# Patient Record
Sex: MALE | Race: WHITE | NOT HISPANIC OR LATINO
[De-identification: names, ages, dates, MRNs, and addresses within clinical notes are randomized per-mention and may not be internally consistent; named-entity substitution may affect disease eponyms.]

---

## 2024-03-25 ENCOUNTER — NON-APPOINTMENT (OUTPATIENT)
Age: 89
End: 2024-03-25

## 2024-03-25 ENCOUNTER — APPOINTMENT (OUTPATIENT)
Dept: INTERNAL MEDICINE | Facility: CLINIC | Age: 89
End: 2024-03-25
Payer: MEDICARE

## 2024-03-25 VITALS — DIASTOLIC BLOOD PRESSURE: 60 MMHG | SYSTOLIC BLOOD PRESSURE: 118 MMHG

## 2024-03-25 VITALS
RESPIRATION RATE: 14 BRPM | HEART RATE: 71 BPM | OXYGEN SATURATION: 97 % | WEIGHT: 217 LBS | SYSTOLIC BLOOD PRESSURE: 138 MMHG | DIASTOLIC BLOOD PRESSURE: 62 MMHG | TEMPERATURE: 97.4 F | HEIGHT: 70.5 IN | BODY MASS INDEX: 30.72 KG/M2

## 2024-03-25 DIAGNOSIS — Z80.0 FAMILY HISTORY OF MALIGNANT NEOPLASM OF DIGESTIVE ORGANS: ICD-10-CM

## 2024-03-25 DIAGNOSIS — H91.90 UNSPECIFIED HEARING LOSS, UNSPECIFIED EAR: ICD-10-CM

## 2024-03-25 DIAGNOSIS — D69.6 THROMBOCYTOPENIA, UNSPECIFIED: ICD-10-CM

## 2024-03-25 DIAGNOSIS — D64.9 ANEMIA, UNSPECIFIED: ICD-10-CM

## 2024-03-25 DIAGNOSIS — Z00.00 ENCOUNTER FOR GENERAL ADULT MEDICAL EXAMINATION W/OUT ABNORMAL FINDINGS: ICD-10-CM

## 2024-03-25 DIAGNOSIS — R73.03 PREDIABETES.: ICD-10-CM

## 2024-03-25 DIAGNOSIS — Z87.891 PERSONAL HISTORY OF NICOTINE DEPENDENCE: ICD-10-CM

## 2024-03-25 DIAGNOSIS — Z63.4 DISAPPEARANCE AND DEATH OF FAMILY MEMBER: ICD-10-CM

## 2024-03-25 DIAGNOSIS — E66.9 OBESITY, UNSPECIFIED: ICD-10-CM

## 2024-03-25 DIAGNOSIS — N40.1 BENIGN PROSTATIC HYPERPLASIA WITH LOWER URINARY TRACT SYMPMS: ICD-10-CM

## 2024-03-25 DIAGNOSIS — Z82.49 FAMILY HISTORY OF ISCHEMIC HEART DISEASE AND OTHER DISEASES OF THE CIRCULATORY SYSTEM: ICD-10-CM

## 2024-03-25 DIAGNOSIS — K21.9 GASTRO-ESOPHAGEAL REFLUX DISEASE W/OUT ESOPHAGITIS: ICD-10-CM

## 2024-03-25 DIAGNOSIS — N13.8 BENIGN PROSTATIC HYPERPLASIA WITH LOWER URINARY TRACT SYMPMS: ICD-10-CM

## 2024-03-25 PROCEDURE — 93000 ELECTROCARDIOGRAM COMPLETE: CPT

## 2024-03-25 PROCEDURE — G0439: CPT

## 2024-03-25 PROCEDURE — 36415 COLL VENOUS BLD VENIPUNCTURE: CPT

## 2024-03-25 RX ORDER — OMEPRAZOLE 40 MG/1
40 CAPSULE, DELAYED RELEASE ORAL
Refills: 0 | Status: ACTIVE | COMMUNITY

## 2024-03-25 RX ORDER — FINASTERIDE 5 MG/1
5 TABLET, FILM COATED ORAL
Qty: 90 | Refills: 3 | Status: ACTIVE | COMMUNITY

## 2024-03-25 RX ORDER — TERAZOSIN HCL 2 MG
TABLET ORAL
Refills: 0 | Status: ACTIVE | COMMUNITY

## 2024-03-25 SDOH — SOCIAL STABILITY - SOCIAL INSECURITY: DISSAPEARANCE AND DEATH OF FAMILY MEMBER: Z63.4

## 2024-03-25 NOTE — HISTORY OF PRESENT ILLNESS
[FreeTextEntry1] : New pt [de-identified] : 89 year old M comes to establish medical care and for an annual exam. Previously followed by Dr. Peterson. Last physical in 3/23.  He has hx of BPH, GERD, and obesity. He is on Finasteride, Terazosin, and Omeprazole.  [facility] [place]. He wears hearing aids. Had full blood work checked 2 weeks ago.  He had b/l knee replacement > 10 yr ago. He reports his knees have been bothering him lately.

## 2024-03-25 NOTE — REVIEW OF SYSTEMS
[Negative] : Heme/Lymph [Joint Pain] : joint pain [Hearing Loss] : hearing loss [FreeTextEntry9] : b/l knee pain

## 2024-03-25 NOTE — HEALTH RISK ASSESSMENT
[Excellent] : ~his/her~  mood as  excellent [No] : In the past 12 months have you used drugs other than those required for medical reasons? No [No falls in past year] : Patient reported no falls in the past year [0] : 2) Feeling down, depressed, or hopeless: Not at all (0) [PHQ-2 Negative - No further assessment needed] : PHQ-2 Negative - No further assessment needed [Fully functional (bathing, dressing, toileting, transferring, walking, feeding)] : Fully functional (bathing, dressing, toileting, transferring, walking, feeding) [Fully functional (using the telephone, shopping, preparing meals, housekeeping, doing laundry, using] : Fully functional and needs no help or supervision to perform IADLs (using the telephone, shopping, preparing meals, housekeeping, doing laundry, using transportation, managing medications and managing finances) [Smoke Detector] : smoke detector [Carbon Monoxide Detector] : carbon monoxide detector [Sunscreen] : uses sunscreen [Seat Belt] :  uses seat belt [Former] : Former [> 15 Years] : > 15 Years [20 or more] : 20 or more [[id number]] : 0 [Reports changes in hearing] : Reports no changes in hearing [Reports changes in dental health] : Reports no changes in dental health [Reports changes in vision] : Reports no changes in vision

## 2024-03-25 NOTE — PLAN
[FreeTextEntry1] : [facility] from [facility] reviewed. Order BMP and PSA. Discussed diet, exercise, and weight maintenance. EKG NSR. Vaccines UTD. No need for further colonoscopy screening. BPH - on Finasteride and Terazosin. [facility]s renewed by [facility]. GERD - on PPI. Mild anemia / thrombocytopenia - chronic and stable.  See ortho for chronic knee pain, had previous b/l knee replacement.  RTO in 1 yr for annual exam or earlier PRN for acute care.

## 2024-03-25 NOTE — PHYSICAL EXAM
[Well Nourished] : well nourished [Well Developed] : well developed [No Acute Distress] : no acute distress [Normal Sclera/Conjunctiva] : normal sclera/conjunctiva [Well-Appearing] : well-appearing [PERRL] : pupils equal round and reactive to light [EOMI] : extraocular movements intact [Normal Outer Ear/Nose] : the outer ears and nose were normal in appearance [Normal Oropharynx] : the oropharynx was normal [Normal TMs] : both tympanic membranes were normal [No JVD] : no jugular venous distention [No Lymphadenopathy] : no lymphadenopathy [Supple] : supple [Thyroid Normal, No Nodules] : the thyroid was normal and there were no nodules present [No Respiratory Distress] : no respiratory distress  [Clear to Auscultation] : lungs were clear to auscultation bilaterally [Normal Rate] : normal rate  [Normal S1, S2] : normal S1 and S2 [Regular Rhythm] : with a regular rhythm [No Carotid Bruits] : no carotid bruits [No Murmur] : no murmur heard [No Varicosities] : no varicosities [Pedal Pulses Present] : the pedal pulses are present [No Edema] : there was no peripheral edema [Soft] : abdomen soft [Non Tender] : non-tender [No Masses] : no abdominal mass palpated [Non-distended] : non-distended [Normal Bowel Sounds] : normal bowel sounds [Normal Posterior Cervical Nodes] : no posterior cervical lymphadenopathy [Normal Anterior Cervical Nodes] : no anterior cervical lymphadenopathy [No Spinal Tenderness] : no spinal tenderness [No CVA Tenderness] : no CVA  tenderness [No Joint Swelling] : no joint swelling [Grossly Normal Strength/Tone] : grossly normal strength/tone [No Rash] : no rash [Coordination Grossly Intact] : coordination grossly intact [Normal Gait] : normal gait [No Focal Deficits] : no focal deficits [Normal Affect] : the affect was normal [Deep Tendon Reflexes (DTR)] : deep tendon reflexes were 2+ and symmetric [Normal Mood] : the mood was normal [Normal Insight/Judgement] : insight and judgment were intact [de-identified] : b/l knee surgical scars  [de-identified] : TM clear, right hearing aid [de-identified] : friendly male, looks excellent for stated age

## 2024-04-27 LAB
ANION GAP SERPL CALC-SCNC: 11 MMOL/L
BUN SERPL-MCNC: 23 MG/DL
CALCIUM SERPL-MCNC: 8.8 MG/DL
CHLORIDE SERPL-SCNC: 105 MMOL/L
CO2 SERPL-SCNC: 25 MMOL/L
CREAT SERPL-MCNC: 1.3 MG/DL
EGFR: 53 ML/MIN/1.73M2
ESTIMATED AVERAGE GLUCOSE: 123 MG/DL
GLUCOSE SERPL-MCNC: 126 MG/DL
HBA1C MFR BLD HPLC: 5.9 %
POTASSIUM SERPL-SCNC: 4.2 MMOL/L
PSA SERPL-MCNC: 1.16 NG/ML
SODIUM SERPL-SCNC: 141 MMOL/L

## 2024-08-07 ENCOUNTER — RX ONLY (RX ONLY)
Age: 89
End: 2024-08-07

## 2024-08-07 ENCOUNTER — OFFICE (OUTPATIENT)
Dept: URBAN - METROPOLITAN AREA CLINIC 109 | Facility: CLINIC | Age: 89
Setting detail: OPHTHALMOLOGY
End: 2024-08-07
Payer: COMMERCIAL

## 2024-08-07 DIAGNOSIS — H02.89: ICD-10-CM

## 2024-08-07 DIAGNOSIS — H40.033: ICD-10-CM

## 2024-08-07 DIAGNOSIS — H25.13: ICD-10-CM

## 2024-08-07 PROCEDURE — 76514 ECHO EXAM OF EYE THICKNESS: CPT | Performed by: OPHTHALMOLOGY

## 2024-08-07 PROCEDURE — 92133 CPTRZD OPH DX IMG PST SGM ON: CPT | Performed by: OPHTHALMOLOGY

## 2024-08-07 PROCEDURE — 92020 GONIOSCOPY: CPT | Performed by: OPHTHALMOLOGY

## 2024-08-07 PROCEDURE — 92004 COMPRE OPH EXAM NEW PT 1/>: CPT | Performed by: OPHTHALMOLOGY

## 2024-08-07 ASSESSMENT — CONFRONTATIONAL VISUAL FIELD TEST (CVF)
OS_FINDINGS: FULL
OD_FINDINGS: FULL

## 2024-08-07 ASSESSMENT — LID EXAM ASSESSMENTS
OS_MEIBOMITIS: LUL 1+
OD_MEIBOMITIS: RUL 1+

## 2024-09-20 ENCOUNTER — OFFICE (OUTPATIENT)
Dept: URBAN - METROPOLITAN AREA CLINIC 109 | Facility: CLINIC | Age: 89
Setting detail: OPHTHALMOLOGY
End: 2024-09-20
Payer: COMMERCIAL

## 2024-09-20 DIAGNOSIS — H25.11: ICD-10-CM

## 2024-09-20 DIAGNOSIS — H52.223: ICD-10-CM

## 2024-09-20 DIAGNOSIS — H25.13: ICD-10-CM

## 2024-09-20 PROCEDURE — 92136 OPHTHALMIC BIOMETRY: CPT | Performed by: OPHTHALMOLOGY

## 2024-09-20 PROCEDURE — 99213 OFFICE O/P EST LOW 20 MIN: CPT | Performed by: OPHTHALMOLOGY

## 2024-09-20 PROCEDURE — 92025 CPTRIZED CORNEAL TOPOGRAPHY: CPT | Performed by: OPHTHALMOLOGY

## 2024-09-20 ASSESSMENT — CONFRONTATIONAL VISUAL FIELD TEST (CVF)
OS_FINDINGS: FULL
OD_FINDINGS: FULL

## 2024-09-20 ASSESSMENT — LID EXAM ASSESSMENTS
OS_MEIBOMITIS: LUL 1+
OD_MEIBOMITIS: RUL 1+

## 2024-09-25 PROBLEM — Z01.818 PREOPERATIVE EXAMINATION: Status: ACTIVE | Noted: 2024-09-25

## 2024-09-26 ENCOUNTER — APPOINTMENT (OUTPATIENT)
Dept: INTERNAL MEDICINE | Facility: CLINIC | Age: 89
End: 2024-09-26
Payer: MEDICARE

## 2024-09-26 VITALS
HEART RATE: 79 BPM | DIASTOLIC BLOOD PRESSURE: 66 MMHG | RESPIRATION RATE: 14 BRPM | SYSTOLIC BLOOD PRESSURE: 140 MMHG | HEIGHT: 70.5 IN | BODY MASS INDEX: 30.58 KG/M2 | WEIGHT: 216 LBS | OXYGEN SATURATION: 95 %

## 2024-09-26 DIAGNOSIS — Z01.818 ENCOUNTER FOR OTHER PREPROCEDURAL EXAMINATION: ICD-10-CM

## 2024-09-26 PROCEDURE — 99214 OFFICE O/P EST MOD 30 MIN: CPT

## 2024-09-26 NOTE — ASSESSMENT
[Patient Optimized for Surgery] : Patient optimized for surgery [No Further Testing Recommended] : no further testing recommended [FreeTextEntry4] : Patient is a low cardiac risk for a low risk surgical procedure. No medical contraindications for planned surgery.

## 2024-09-26 NOTE — PHYSICAL EXAM
[No Acute Distress] : no acute distress [Well Nourished] : well nourished [Well Developed] : well developed [Well-Appearing] : well-appearing [Normal Sclera/Conjunctiva] : normal sclera/conjunctiva [PERRL] : pupils equal round and reactive to light [EOMI] : extraocular movements intact [Normal Outer Ear/Nose] : the outer ears and nose were normal in appearance [Normal Oropharynx] : the oropharynx was normal [Normal TMs] : both tympanic membranes were normal [No Lymphadenopathy] : no lymphadenopathy [Supple] : supple [Thyroid Normal, No Nodules] : the thyroid was normal and there were no nodules present [No Respiratory Distress] : no respiratory distress  [Clear to Auscultation] : lungs were clear to auscultation bilaterally [Normal Rate] : normal rate  [Regular Rhythm] : with a regular rhythm [Normal S1, S2] : normal S1 and S2 [No Murmur] : no murmur heard [No Varicosities] : no varicosities [Pedal Pulses Present] : the pedal pulses are present [No Edema] : there was no peripheral edema [Soft] : abdomen soft [Non Tender] : non-tender [Non-distended] : non-distended [No Masses] : no abdominal mass palpated [Normal Bowel Sounds] : normal bowel sounds [Normal Posterior Cervical Nodes] : no posterior cervical lymphadenopathy [Normal Anterior Cervical Nodes] : no anterior cervical lymphadenopathy [No CVA Tenderness] : no CVA  tenderness [No Spinal Tenderness] : no spinal tenderness [No Joint Swelling] : no joint swelling [Grossly Normal Strength/Tone] : grossly normal strength/tone [No Rash] : no rash [Coordination Grossly Intact] : coordination grossly intact [No Focal Deficits] : no focal deficits [Normal Gait] : normal gait [Deep Tendon Reflexes (DTR)] : deep tendon reflexes were 2+ and symmetric [Normal Affect] : the affect was normal [Normal Mood] : the mood was normal [Normal Insight/Judgement] : insight and judgment were intact [de-identified] : friendly male, looks excellent for stated age  [de-identified] : b/l knee surgical scars  [de-identified] : right hearing aid

## 2024-09-26 NOTE — HISTORY OF PRESENT ILLNESS
[No Pertinent Cardiac History] : no history of aortic stenosis, atrial fibrillation, coronary artery disease, recent myocardial infarction, or implantable device/pacemaker [No Pertinent Pulmonary History] : no history of asthma, COPD, sleep apnea, or smoking [No Adverse Anesthesia Reaction] : no adverse anesthesia reaction in self or family member [(Patient denies any chest pain, claudication, dyspnea on exertion, orthopnea, palpitations or syncope)] : Patient denies any chest pain, claudication, dyspnea on exertion, orthopnea, palpitations or syncope [Excellent (>10 METs)] : Excellent (>10 METs) [Chronic Anticoagulation] : no chronic anticoagulation [Chronic Kidney Disease] : no chronic kidney disease [Diabetes] : no diabetes [FreeTextEntry1] : Cataract extraction [FreeTextEntry2] : 10/1/24 (right), 10/15/24 (left) [FreeTextEntry3] : Dr. Kong Barroso [FreeTextEntry7] : EKG 3/24 - normal

## 2024-09-26 NOTE — REVIEW OF SYSTEMS
[Hearing Loss] : hearing loss [Joint Pain] : joint pain [Negative] : Heme/Lymph [FreeTextEntry9] : b/l knee pain [Vision Problems] : vision problems

## 2024-10-01 ENCOUNTER — AMBULATORY SURGERY CENTER (OUTPATIENT)
Dept: URBAN - METROPOLITAN AREA SURGERY 19 | Facility: SURGERY | Age: 89
Setting detail: OPHTHALMOLOGY
End: 2024-10-01
Payer: COMMERCIAL

## 2024-10-01 DIAGNOSIS — H52.221: ICD-10-CM

## 2024-10-01 DIAGNOSIS — H25.11: ICD-10-CM

## 2024-10-01 PROCEDURE — V2787 ASTIGMATISM-CORRECT FUNCTION: HCPCS | Performed by: OPHTHALMOLOGY

## 2024-10-01 PROCEDURE — 66984 XCAPSL CTRC RMVL W/O ECP: CPT | Mod: RT | Performed by: OPHTHALMOLOGY

## 2024-10-02 ENCOUNTER — OFFICE (OUTPATIENT)
Dept: URBAN - METROPOLITAN AREA CLINIC 109 | Facility: CLINIC | Age: 89
Setting detail: OPHTHALMOLOGY
End: 2024-10-02
Payer: COMMERCIAL

## 2024-10-02 ENCOUNTER — RX ONLY (RX ONLY)
Age: 89
End: 2024-10-02

## 2024-10-02 DIAGNOSIS — Z96.1: ICD-10-CM

## 2024-10-02 PROBLEM — H25.12 CATARACT SENILE NUCLEAR SCLEROSIS; LEFT EYE: Status: ACTIVE | Noted: 2024-09-20

## 2024-10-02 PROBLEM — H52.223 ASTIGMATISM, REGULAR; BOTH EYES: Status: ACTIVE | Noted: 2024-09-20

## 2024-10-02 PROCEDURE — 99024 POSTOP FOLLOW-UP VISIT: CPT | Performed by: OPTOMETRIST

## 2024-10-02 ASSESSMENT — KERATOMETRY
OS_AXISANGLE_DEGREES: 080
OD_K1POWER_DIOPTERS: 42.75
OD_AXISANGLE_DEGREES: 014
OD_K2POWER_DIOPTERS: 44.75
OS_K2POWER_DIOPTERS: 44.75
OS_K1POWER_DIOPTERS: 44.00

## 2024-10-02 ASSESSMENT — REFRACTION_MANIFEST
OS_SPHERE: +2.50
OD_VA1: 20/NI
OS_VA1: 20/NI
OD_CYLINDER: -3.00
OD_AXIS: 097
OS_AXIS: 105
OS_CYLINDER: -1.25
OD_SPHERE: +1.50

## 2024-10-02 ASSESSMENT — REFRACTION_AUTOREFRACTION
OD_CYLINDER: -3.25
OS_CYLINDER: -1.25
OS_SPHERE: +2.75
OD_AXIS: 097
OS_AXIS: 105
OD_SPHERE: +1.50

## 2024-10-02 ASSESSMENT — CORNEAL EDEMA CLINICAL DESCRIPTION: OD_CORNEALEDEMA: T 1+

## 2024-10-02 ASSESSMENT — LID EXAM ASSESSMENTS
OD_MEIBOMITIS: RUL 1+
OS_MEIBOMITIS: LUL 1+

## 2024-10-02 ASSESSMENT — REFRACTION_CURRENTRX
OD_SPHERE: +3.75
OD_ADD: +3.00
OD_OVR_VA: 20/
OS_SPHERE: +4.50
OD_CYLINDER: -2.25
OS_CYLINDER: -1.75
OS_AXIS: 088
OS_OVR_VA: 20/
OD_AXIS: 094
OS_ADD: +3.00

## 2024-10-02 ASSESSMENT — CONFRONTATIONAL VISUAL FIELD TEST (CVF)
OS_FINDINGS: FULL
OD_FINDINGS: FULL

## 2024-10-02 ASSESSMENT — TONOMETRY
OD_IOP_MMHG: 16
OS_IOP_MMHG: 14

## 2024-10-02 ASSESSMENT — PACHYMETRY
OS_CT_CORRECTION: -2
OS_CT_UM: 571
OD_CT_CORRECTION: -2
OD_CT_UM: 577

## 2024-10-02 ASSESSMENT — VISUAL ACUITY
OD_BCVA: 20/25-3
OS_BCVA: 20/60-2

## 2024-10-09 ENCOUNTER — RX ONLY (RX ONLY)
Age: 89
End: 2024-10-09

## 2024-10-09 ENCOUNTER — OFFICE (OUTPATIENT)
Dept: URBAN - METROPOLITAN AREA CLINIC 109 | Facility: CLINIC | Age: 89
Setting detail: OPHTHALMOLOGY
End: 2024-10-09
Payer: COMMERCIAL

## 2024-10-09 DIAGNOSIS — H25.12: ICD-10-CM

## 2024-10-09 DIAGNOSIS — Z96.1: ICD-10-CM

## 2024-10-09 PROCEDURE — 92136 OPHTHALMIC BIOMETRY: CPT | Mod: 26,LT | Performed by: OPHTHALMOLOGY

## 2024-10-09 PROCEDURE — 99024 POSTOP FOLLOW-UP VISIT: CPT | Performed by: OPHTHALMOLOGY

## 2024-10-09 ASSESSMENT — LID EXAM ASSESSMENTS
OD_MEIBOMITIS: RUL 1+
OS_MEIBOMITIS: LUL 1+

## 2024-10-09 ASSESSMENT — REFRACTION_CURRENTRX
OS_AXIS: 088
OD_OVR_VA: 20/
OD_ADD: +3.00
OD_CYLINDER: -2.25
OS_OVR_VA: 20/
OD_AXIS: 094
OS_SPHERE: +4.50
OS_ADD: +3.00
OS_CYLINDER: -1.75
OD_SPHERE: +3.75

## 2024-10-09 ASSESSMENT — TONOMETRY
OS_IOP_MMHG: 11
OD_IOP_MMHG: 10

## 2024-10-09 ASSESSMENT — REFRACTION_MANIFEST
OS_SPHERE: +2.50
OS_AXIS: 105
OD_AXIS: 097
OS_VA1: 20/NI
OD_SPHERE: +1.50
OS_CYLINDER: -1.25
OD_VA1: 20/NI
OD_CYLINDER: -3.00

## 2024-10-09 ASSESSMENT — PACHYMETRY
OS_CT_UM: 571
OS_CT_CORRECTION: -2
OD_CT_UM: 577
OD_CT_CORRECTION: -2

## 2024-10-09 ASSESSMENT — CONFRONTATIONAL VISUAL FIELD TEST (CVF)
OD_FINDINGS: FULL
OS_FINDINGS: FULL

## 2024-10-09 ASSESSMENT — REFRACTION_AUTOREFRACTION
OD_AXIS: 111
OS_SPHERE: +3.50
OD_CYLINDER: -1.00
OS_CYLINDER: -2.00
OS_AXIS: 096
OD_SPHERE: +1.25

## 2024-10-09 ASSESSMENT — VISUAL ACUITY
OS_BCVA: 20/25-3
OD_BCVA: 20/25-3

## 2024-10-09 ASSESSMENT — KERATOMETRY
OS_K2POWER_DIOPTERS: 44.50
OS_K1POWER_DIOPTERS: 43.75
OD_K2POWER_DIOPTERS: 44.50
OD_AXISANGLE_DEGREES: 007
OS_AXISANGLE_DEGREES: 003
OD_K1POWER_DIOPTERS: 42.50

## 2024-10-15 ENCOUNTER — AMBULATORY SURGERY CENTER (OUTPATIENT)
Dept: URBAN - METROPOLITAN AREA SURGERY 19 | Facility: SURGERY | Age: 89
Setting detail: OPHTHALMOLOGY
End: 2024-10-15
Payer: COMMERCIAL

## 2024-10-15 DIAGNOSIS — H25.12: ICD-10-CM

## 2024-10-15 PROCEDURE — 66984 XCAPSL CTRC RMVL W/O ECP: CPT | Mod: 79,LT | Performed by: OPHTHALMOLOGY

## 2024-10-16 ENCOUNTER — RX ONLY (RX ONLY)
Age: 89
End: 2024-10-16

## 2024-10-16 ENCOUNTER — OFFICE (OUTPATIENT)
Dept: URBAN - METROPOLITAN AREA CLINIC 109 | Facility: CLINIC | Age: 89
Setting detail: OPHTHALMOLOGY
End: 2024-10-16
Payer: COMMERCIAL

## 2024-10-16 DIAGNOSIS — Z96.1: ICD-10-CM

## 2024-10-16 PROBLEM — H40.032 NARROW ANGLE GLAUCOMA SUSPECT; LEFT EYE: Status: ACTIVE | Noted: 2024-10-09

## 2024-10-16 PROCEDURE — 99024 POSTOP FOLLOW-UP VISIT: CPT | Performed by: OPTOMETRIST

## 2024-10-16 ASSESSMENT — REFRACTION_AUTOREFRACTION
OD_CYLINDER: -1.00
OS_CYLINDER: -2.00
OD_AXIS: 111
OS_SPHERE: +3.50
OS_AXIS: 096
OD_SPHERE: +1.25

## 2024-10-16 ASSESSMENT — REFRACTION_CURRENTRX
OS_CYLINDER: -1.75
OD_AXIS: 094
OS_ADD: +3.00
OD_CYLINDER: -2.25
OS_SPHERE: +4.50
OD_OVR_VA: 20/
OD_ADD: +3.00
OS_AXIS: 088
OD_SPHERE: +3.75
OS_OVR_VA: 20/

## 2024-10-16 ASSESSMENT — KERATOMETRY
OS_K1POWER_DIOPTERS: 43.75
OD_K2POWER_DIOPTERS: 44.50
OD_AXISANGLE_DEGREES: 007
OD_K1POWER_DIOPTERS: 42.50
OS_K2POWER_DIOPTERS: 44.50
OS_AXISANGLE_DEGREES: 003

## 2024-10-16 ASSESSMENT — REFRACTION_MANIFEST
OD_AXIS: 097
OD_CYLINDER: -3.00
OS_CYLINDER: -1.25
OS_AXIS: 105
OD_SPHERE: +1.50
OS_VA1: 20/NI
OS_SPHERE: +2.50
OD_VA1: 20/NI

## 2024-10-16 ASSESSMENT — VISUAL ACUITY
OD_BCVA: 20/25-1
OS_BCVA: 20/20-1

## 2024-11-12 ENCOUNTER — OFFICE (OUTPATIENT)
Dept: URBAN - METROPOLITAN AREA CLINIC 109 | Facility: CLINIC | Age: 89
Setting detail: OPHTHALMOLOGY
End: 2024-11-12
Payer: COMMERCIAL

## 2024-11-12 DIAGNOSIS — Z96.1: ICD-10-CM

## 2024-11-12 PROCEDURE — 99024 POSTOP FOLLOW-UP VISIT: CPT | Performed by: OPTOMETRIST

## 2024-11-12 ASSESSMENT — PACHYMETRY
OS_CT_CORRECTION: -2
OS_CT_UM: 571
OD_CT_UM: 577
OD_CT_CORRECTION: -2

## 2024-11-12 ASSESSMENT — REFRACTION_MANIFEST
OD_VA1: 20/NI
OS_VA1: 20/NI
OD_SPHERE: +1.50
OD_AXIS: 097
OD_CYLINDER: -3.00
OS_SPHERE: +2.50
OS_AXIS: 105
OS_CYLINDER: -1.25

## 2024-11-12 ASSESSMENT — REFRACTION_CURRENTRX
OS_OVR_VA: 20/
OD_OVR_VA: 20/
OS_ADD: +3.00
OD_SPHERE: +3.75
OD_CYLINDER: -2.25
OS_CYLINDER: -1.75
OS_SPHERE: +4.50
OD_ADD: +3.00
OD_AXIS: 094
OS_AXIS: 088

## 2024-11-12 ASSESSMENT — REFRACTION_AUTOREFRACTION
OS_CYLINDER: -1.75
OD_AXIS: 005
OS_AXIS: 028
OD_CYLINDER: -1.00
OS_SPHERE: -0.25
OD_SPHERE: PL

## 2024-11-12 ASSESSMENT — CONFRONTATIONAL VISUAL FIELD TEST (CVF)
OS_FINDINGS: FULL
OD_FINDINGS: FULL

## 2024-11-12 ASSESSMENT — LID EXAM ASSESSMENTS
OD_MEIBOMITIS: RUL 1+
OS_MEIBOMITIS: LUL 1+

## 2024-11-12 ASSESSMENT — KERATOMETRY
OS_K1POWER_DIOPTERS: 43.75
OD_K1POWER_DIOPTERS: 42.50
OS_AXISANGLE_DEGREES: 003
OS_K2POWER_DIOPTERS: 44.50
OD_K2POWER_DIOPTERS: 44.50
OD_AXISANGLE_DEGREES: 007

## 2024-11-12 ASSESSMENT — TONOMETRY
OD_IOP_MMHG: 18
OS_IOP_MMHG: 16

## 2024-11-12 ASSESSMENT — VISUAL ACUITY
OS_BCVA: 20/20-1
OD_BCVA: 20/25

## 2024-12-03 ENCOUNTER — APPOINTMENT (OUTPATIENT)
Dept: INTERNAL MEDICINE | Facility: CLINIC | Age: 88
End: 2024-12-03
Payer: MEDICARE

## 2024-12-03 VITALS
OXYGEN SATURATION: 96 % | HEART RATE: 67 BPM | DIASTOLIC BLOOD PRESSURE: 62 MMHG | HEIGHT: 70.5 IN | BODY MASS INDEX: 30.01 KG/M2 | RESPIRATION RATE: 14 BRPM | WEIGHT: 212 LBS | SYSTOLIC BLOOD PRESSURE: 120 MMHG | TEMPERATURE: 97.9 F

## 2024-12-03 DIAGNOSIS — R42 DIZZINESS AND GIDDINESS: ICD-10-CM

## 2024-12-03 PROCEDURE — 99213 OFFICE O/P EST LOW 20 MIN: CPT

## 2024-12-03 RX ORDER — MECLIZINE HYDROCHLORIDE 12.5 MG/1
12.5 TABLET ORAL 3 TIMES DAILY
Qty: 30 | Refills: 3 | Status: ACTIVE | COMMUNITY
Start: 2024-12-03 | End: 1900-01-01

## 2024-12-11 ENCOUNTER — OFFICE (OUTPATIENT)
Dept: URBAN - METROPOLITAN AREA CLINIC 109 | Facility: CLINIC | Age: 89
Setting detail: OPHTHALMOLOGY
End: 2024-12-11
Payer: COMMERCIAL

## 2024-12-11 DIAGNOSIS — H53.10: ICD-10-CM

## 2024-12-11 PROCEDURE — 99213 OFFICE O/P EST LOW 20 MIN: CPT | Mod: 24 | Performed by: OPHTHALMOLOGY

## 2024-12-11 ASSESSMENT — REFRACTION_MANIFEST
OD_AXIS: 120
OD_SPHERE: PLANO
OD_VA1: 20/25+
OD_CYLINDER: -0.50

## 2024-12-11 ASSESSMENT — REFRACTION_AUTOREFRACTION
OD_SPHERE: +0.50
OD_CYLINDER: -0.75
OS_CYLINDER: -1.00
OD_AXIS: 120
OS_AXIS: 107
OS_SPHERE: +0.50

## 2024-12-11 ASSESSMENT — REFRACTION_CURRENTRX
OD_SPHERE: +3.75
OS_CYLINDER: -1.75
OD_ADD: +3.00
OS_OVR_VA: 20/
OS_SPHERE: +4.50
OD_OVR_VA: 20/
OD_AXIS: 094
OS_ADD: +3.00
OD_CYLINDER: -2.25
OS_AXIS: 088

## 2024-12-11 ASSESSMENT — PACHYMETRY
OD_CT_UM: 577
OS_CT_UM: 571
OS_CT_CORRECTION: -2
OD_CT_CORRECTION: -2

## 2024-12-11 ASSESSMENT — CONFRONTATIONAL VISUAL FIELD TEST (CVF)
OD_FINDINGS: FULL
OS_FINDINGS: FULL

## 2024-12-11 ASSESSMENT — KERATOMETRY
OD_K2POWER_DIOPTERS: 44.50
OD_K1POWER_DIOPTERS: 42.50
OS_K1POWER_DIOPTERS: 43.75
OD_AXISANGLE_DEGREES: 007
OS_K2POWER_DIOPTERS: 44.50
OS_AXISANGLE_DEGREES: 003

## 2024-12-11 ASSESSMENT — TONOMETRY
OD_IOP_MMHG: 15
OS_IOP_MMHG: 16

## 2024-12-11 ASSESSMENT — VISUAL ACUITY
OS_BCVA: 20/40+2
OD_BCVA: 20/20

## 2024-12-11 ASSESSMENT — LID EXAM ASSESSMENTS
OD_MEIBOMITIS: RUL 1+
OS_MEIBOMITIS: LUL 1+

## 2025-03-11 ENCOUNTER — OFFICE (OUTPATIENT)
Dept: URBAN - METROPOLITAN AREA CLINIC 109 | Facility: CLINIC | Age: OVER 89
Setting detail: OPHTHALMOLOGY
End: 2025-03-11
Payer: COMMERCIAL

## 2025-03-11 DIAGNOSIS — H53.10: ICD-10-CM

## 2025-03-11 DIAGNOSIS — H02.89: ICD-10-CM

## 2025-03-11 PROCEDURE — 99213 OFFICE O/P EST LOW 20 MIN: CPT | Performed by: OPTOMETRIST

## 2025-03-11 ASSESSMENT — REFRACTION_MANIFEST
OS_ADD: +2.50
OS_AXIS: 090
OD_VA1: 20/20
OS_CYLINDER: -0.50
OU_VA: 20/20
OD_SPHERE: +0.25
OD_AXIS: 110
OS_VA1: 20/20
OD_ADD: +2.50
OS_SPHERE: +0.75
OD_CYLINDER: -0.25

## 2025-03-11 ASSESSMENT — REFRACTION_CURRENTRX
OS_CYLINDER: -1.75
OS_AXIS: 088
OD_SPHERE: +3.75
OS_SPHERE: +4.50
OS_OVR_VA: 20/
OD_CYLINDER: -2.25
OS_ADD: +3.00
OD_AXIS: 094
OD_OVR_VA: 20/
OD_ADD: +3.00

## 2025-03-11 ASSESSMENT — TONOMETRY
OS_IOP_MMHG: 13
OD_IOP_MMHG: 11

## 2025-03-11 ASSESSMENT — CONFRONTATIONAL VISUAL FIELD TEST (CVF)
OS_FINDINGS: FULL
OD_FINDINGS: FULL

## 2025-03-11 ASSESSMENT — REFRACTION_AUTOREFRACTION
OS_AXIS: 091
OD_SPHERE: +0.75
OS_CYLINDER: -1.50
OD_CYLINDER: -0.75
OD_AXIS: 108
OS_SPHERE: +1.25

## 2025-03-11 ASSESSMENT — KERATOMETRY
OD_K2POWER_DIOPTERS: 44.50
OS_K2POWER_DIOPTERS: 44.50
OD_AXISANGLE_DEGREES: 007
OD_K1POWER_DIOPTERS: 42.50
OS_K1POWER_DIOPTERS: 43.75
OS_AXISANGLE_DEGREES: 003

## 2025-03-11 ASSESSMENT — PACHYMETRY
OS_CT_UM: 571
OD_CT_UM: 577
OD_CT_CORRECTION: -2
OS_CT_CORRECTION: -2

## 2025-03-11 ASSESSMENT — LID EXAM ASSESSMENTS
OS_MEIBOMITIS: LUL 1+
OD_MEIBOMITIS: RUL 1+

## 2025-03-11 ASSESSMENT — VISUAL ACUITY
OD_BCVA: 20/20-1
OS_BCVA: 20/20-1

## 2025-03-31 ENCOUNTER — NON-APPOINTMENT (OUTPATIENT)
Age: 89
End: 2025-03-31

## 2025-03-31 ENCOUNTER — APPOINTMENT (OUTPATIENT)
Dept: INTERNAL MEDICINE | Facility: CLINIC | Age: 89
End: 2025-03-31
Payer: MEDICARE

## 2025-03-31 VITALS
SYSTOLIC BLOOD PRESSURE: 126 MMHG | HEART RATE: 74 BPM | WEIGHT: 210 LBS | TEMPERATURE: 98.1 F | DIASTOLIC BLOOD PRESSURE: 56 MMHG | RESPIRATION RATE: 14 BRPM | HEIGHT: 70.5 IN | BODY MASS INDEX: 29.73 KG/M2 | OXYGEN SATURATION: 95 %

## 2025-03-31 DIAGNOSIS — Z00.00 ENCOUNTER FOR GENERAL ADULT MEDICAL EXAMINATION W/OUT ABNORMAL FINDINGS: ICD-10-CM

## 2025-03-31 DIAGNOSIS — R73.03 PREDIABETES.: ICD-10-CM

## 2025-03-31 DIAGNOSIS — K21.9 GASTRO-ESOPHAGEAL REFLUX DISEASE W/OUT ESOPHAGITIS: ICD-10-CM

## 2025-03-31 DIAGNOSIS — N13.8 BENIGN PROSTATIC HYPERPLASIA WITH LOWER URINARY TRACT SYMPMS: ICD-10-CM

## 2025-03-31 DIAGNOSIS — D64.9 ANEMIA, UNSPECIFIED: ICD-10-CM

## 2025-03-31 DIAGNOSIS — N40.1 BENIGN PROSTATIC HYPERPLASIA WITH LOWER URINARY TRACT SYMPMS: ICD-10-CM

## 2025-03-31 DIAGNOSIS — R09.89 OTHER SPECIFIED SYMPTOMS AND SIGNS INVOLVING THE CIRCULATORY AND RESPIRATORY SYSTEMS: ICD-10-CM

## 2025-03-31 DIAGNOSIS — I49.8 OTHER SPECIFIED CARDIAC ARRHYTHMIAS: ICD-10-CM

## 2025-03-31 DIAGNOSIS — E66.811 OBESITY, CLASS 1: ICD-10-CM

## 2025-03-31 DIAGNOSIS — D69.6 THROMBOCYTOPENIA, UNSPECIFIED: ICD-10-CM

## 2025-03-31 LAB — HBA1C MFR BLD HPLC: 6

## 2025-03-31 PROCEDURE — G0439: CPT

## 2025-03-31 PROCEDURE — 93000 ELECTROCARDIOGRAM COMPLETE: CPT

## 2025-03-31 PROCEDURE — 99214 OFFICE O/P EST MOD 30 MIN: CPT | Mod: 25

## 2025-04-23 ENCOUNTER — APPOINTMENT (OUTPATIENT)
Dept: OTOLARYNGOLOGY | Facility: CLINIC | Age: 89
End: 2025-04-23
Payer: MEDICARE

## 2025-04-23 VITALS
WEIGHT: 220 LBS | HEIGHT: 70.5 IN | BODY MASS INDEX: 31.14 KG/M2 | HEART RATE: 60 BPM | SYSTOLIC BLOOD PRESSURE: 110 MMHG | DIASTOLIC BLOOD PRESSURE: 55 MMHG

## 2025-04-23 DIAGNOSIS — J34.2 DEVIATED NASAL SEPTUM: ICD-10-CM

## 2025-04-23 DIAGNOSIS — R51.9 HEADACHE, UNSPECIFIED: ICD-10-CM

## 2025-04-23 PROCEDURE — 31231 NASAL ENDOSCOPY DX: CPT

## 2025-04-23 PROCEDURE — 99203 OFFICE O/P NEW LOW 30 MIN: CPT | Mod: 25

## 2025-06-04 ENCOUNTER — OFFICE (OUTPATIENT)
Dept: URBAN - METROPOLITAN AREA CLINIC 109 | Facility: CLINIC | Age: OVER 89
Setting detail: OPHTHALMOLOGY
End: 2025-06-04
Payer: COMMERCIAL

## 2025-06-04 ENCOUNTER — RX ONLY (RX ONLY)
Age: OVER 89
End: 2025-06-04

## 2025-06-04 DIAGNOSIS — H02.89: ICD-10-CM

## 2025-06-04 PROBLEM — H53.2 DIPLOPIA: Status: ACTIVE | Noted: 2025-06-04

## 2025-06-04 PROCEDURE — 92014 COMPRE OPH EXAM EST PT 1/>: CPT | Performed by: OPHTHALMOLOGY

## 2025-06-04 ASSESSMENT — REFRACTION_CURRENTRX
OD_ADD: +3.00
OD_AXIS: 110
OD_OVR_VA: 20/
OS_CYLINDER: -0.50
OS_CYLINDER: -1.75
OD_AXIS: 094
OD_SPHERE: +3.75
OS_ADD: +2.50
OS_ADD: +3.00
OS_OVR_VA: 20/
OD_CYLINDER: -2.25
OD_SPHERE: +0.25
OD_CYLINDER: -0.25
OS_SPHERE: +4.50
OD_ADD: +2.50
OS_AXIS: 088
OS_OVR_VA: 20/
OS_AXIS: 090
OS_SPHERE: +0.75
OD_OVR_VA: 20/

## 2025-06-04 ASSESSMENT — REFRACTION_MANIFEST
OD_AXIS: 110
OS_SPHERE: +0.75
OD_VA1: 20/20
OS_AXIS: 090
OU_VA: 20/20
OS_VA1: 20/20
OD_SPHERE: +0.25
OD_CYLINDER: -0.25
OS_ADD: +2.50
OS_CYLINDER: -0.50
OD_ADD: +2.50

## 2025-06-04 ASSESSMENT — REFRACTION_AUTOREFRACTION
OD_CYLINDER: -0.25
OS_AXIS: 098
OD_AXIS: 131
OS_CYLINDER: -1.50
OS_SPHERE: +1.25
OD_SPHERE: +0.25

## 2025-06-04 ASSESSMENT — VISUAL ACUITY
OS_BCVA: 20/20
OD_BCVA: 20/20

## 2025-06-04 ASSESSMENT — KERATOMETRY
OS_K1POWER_DIOPTERS: 44.00
OD_AXISANGLE_DEGREES: 017
OS_AXISANGLE_DEGREES: 160
OD_K1POWER_DIOPTERS: 44.00
OS_K2POWER_DIOPTERS: 44.50
OD_K2POWER_DIOPTERS: 45.00

## 2025-06-04 ASSESSMENT — LID EXAM ASSESSMENTS
OD_MEIBOMITIS: RUL 1+
OS_MEIBOMITIS: LUL 1+

## 2025-06-04 ASSESSMENT — CONFRONTATIONAL VISUAL FIELD TEST (CVF)
OD_FINDINGS: FULL
OS_FINDINGS: FULL

## 2025-06-04 ASSESSMENT — PACHYMETRY
OS_CT_CORRECTION: -2
OS_CT_UM: 571
OD_CT_CORRECTION: -2
OD_CT_UM: 577

## 2025-06-04 ASSESSMENT — TONOMETRY
OS_IOP_MMHG: 10
OD_IOP_MMHG: 11

## 2025-07-30 ASSESSMENT — REFRACTION_CURRENTRX
OD_AXIS: 094
OD_OVR_VA: 20/
OD_ADD: +3.00
OS_SPHERE: +4.50
OD_SPHERE: +3.75
OS_CYLINDER: -1.75
OD_CYLINDER: -2.25
OS_AXIS: 088
OS_ADD: +3.00
OS_OVR_VA: 20/

## 2025-07-30 ASSESSMENT — KERATOMETRY
OD_K1POWER_DIOPTERS: 42.50
OS_K1POWER_DIOPTERS: 43.75
OD_AXISANGLE_DEGREES: 007
OS_AXISANGLE_DEGREES: 003
OD_K2POWER_DIOPTERS: 44.50
OS_K2POWER_DIOPTERS: 44.50

## 2025-07-30 ASSESSMENT — REFRACTION_MANIFEST
OD_VA1: 20/25+
OD_SPHERE: PLANO
OD_CYLINDER: -0.50
OD_AXIS: 120

## 2025-08-09 ENCOUNTER — OFFICE (OUTPATIENT)
Facility: LOCATION | Age: OVER 89
Setting detail: OPHTHALMOLOGY
End: 2025-08-09
Payer: COMMERCIAL

## 2025-08-09 DIAGNOSIS — H50.00: ICD-10-CM

## 2025-08-09 DIAGNOSIS — H35.363: ICD-10-CM

## 2025-08-09 DIAGNOSIS — H53.433: ICD-10-CM

## 2025-08-09 PROBLEM — H16.223 DRY EYE SYNDROME K SICCA; BOTH EYES: Status: ACTIVE | Noted: 2025-08-09

## 2025-08-09 PROCEDURE — 92083 EXTENDED VISUAL FIELD XM: CPT | Performed by: OPHTHALMOLOGY

## 2025-08-09 PROCEDURE — 92133 CPTRZD OPH DX IMG PST SGM ON: CPT | Performed by: OPHTHALMOLOGY

## 2025-08-09 PROCEDURE — 92201 OPSCPY EXTND RTA DRAW UNI/BI: CPT | Performed by: OPHTHALMOLOGY

## 2025-08-09 PROCEDURE — 99214 OFFICE O/P EST MOD 30 MIN: CPT | Performed by: OPHTHALMOLOGY

## 2025-08-09 PROCEDURE — 92060 SENSORIMOTOR EXAMINATION: CPT | Performed by: OPHTHALMOLOGY

## 2025-08-09 ASSESSMENT — REFRACTION_MANIFEST
OD_CYLINDER: -0.25
OD_AXIS: 110
OS_AXIS: 090
OD_VA1: 20/20
OS_SPHERE: +0.75
OS_ADD: +2.50
OD_ADD: +2.50
OS_CYLINDER: -0.50
OU_VA: 20/20
OD_SPHERE: +0.25
OS_VA1: 20/20

## 2025-08-09 ASSESSMENT — REFRACTION_CURRENTRX
OS_SPHERE: +0.75
OS_AXIS: 090
OD_AXIS: 110
OS_OVR_VA: 20/
OD_SPHERE: +0.25
OS_ADD: +2.75
OS_VPRISM_DIRECTION: PROGS
OS_AXIS: 088
OD_CYLINDER: -0.25
OD_OVR_VA: 20/
OS_SPHERE: +0.75
OS_CYLINDER: -0.50
OD_SPHERE: +0.25
OD_VPRISM_DIRECTION: PROGS
OD_CYLINDER: -0.25
OD_ADD: +2.50
OS_OVR_VA: 20/
OS_ADD: +2.50
OS_CYLINDER: -0.75
OD_AXIS: 115
OD_OVR_VA: 20/
OD_ADD: +2.75

## 2025-08-09 ASSESSMENT — CONFRONTATIONAL VISUAL FIELD TEST (CVF)
OD_FINDINGS: FULL
OS_FINDINGS: FULL

## 2025-08-09 ASSESSMENT — TONOMETRY
OD_IOP_MMHG: 13
OS_IOP_MMHG: 12

## 2025-08-09 ASSESSMENT — REFRACTION_AUTOREFRACTION
OS_SPHERE: +0.75
OD_SPHERE: +0.75
OD_CYLINDER: -0.50
OS_CYLINDER: -0.75
OS_AXIS: 131
OD_AXIS: 009

## 2025-08-09 ASSESSMENT — KERATOMETRY
OS_K2POWER_DIOPTERS: 44.50
OD_AXISANGLE_DEGREES: 008
OS_AXISANGLE_DEGREES: 041
OS_K1POWER_DIOPTERS: 43.25
OD_K2POWER_DIOPTERS: 45.25
METHOD_AUTO_MANUAL: AUTO
OD_K1POWER_DIOPTERS: 44.00

## 2025-08-09 ASSESSMENT — PACHYMETRY
OD_CT_UM: 577
OS_CT_CORRECTION: -2
OD_CT_CORRECTION: -2
OS_CT_UM: 571

## 2025-08-09 ASSESSMENT — LID EXAM ASSESSMENTS
OD_MEIBOMITIS: RUL 1+
OS_MEIBOMITIS: LUL 1+

## 2025-08-09 ASSESSMENT — VISUAL ACUITY
OS_BCVA: 20/25-1
OD_BCVA: 20/30-1